# Patient Record
Sex: FEMALE | Race: BLACK OR AFRICAN AMERICAN
[De-identification: names, ages, dates, MRNs, and addresses within clinical notes are randomized per-mention and may not be internally consistent; named-entity substitution may affect disease eponyms.]

---
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
## 2021-06-18 ENCOUNTER — HOSPITAL ENCOUNTER (EMERGENCY)
Dept: HOSPITAL 47 - EC | Age: 21
Discharge: HOME | End: 2021-06-18
Payer: COMMERCIAL

## 2021-06-18 VITALS — HEART RATE: 93 BPM | RESPIRATION RATE: 18 BRPM | DIASTOLIC BLOOD PRESSURE: 54 MMHG | SYSTOLIC BLOOD PRESSURE: 104 MMHG

## 2021-06-18 VITALS — TEMPERATURE: 98.4 F

## 2021-06-18 DIAGNOSIS — O99.281: ICD-10-CM

## 2021-06-18 DIAGNOSIS — E86.0: ICD-10-CM

## 2021-06-18 DIAGNOSIS — O26.891: Primary | ICD-10-CM

## 2021-06-18 DIAGNOSIS — Z3A.13: ICD-10-CM

## 2021-06-18 DIAGNOSIS — R55: ICD-10-CM

## 2021-06-18 LAB
ALBUMIN SERPL-MCNC: 3.7 G/DL (ref 3.5–5)
ALP SERPL-CCNC: 66 U/L (ref 38–126)
ALT SERPL-CCNC: 22 U/L (ref 4–34)
ANION GAP SERPL CALC-SCNC: 9 MMOL/L
APTT BLD: 21.9 SEC (ref 22–30)
AST SERPL-CCNC: 28 U/L (ref 14–36)
BASOPHILS # BLD AUTO: 0 K/UL (ref 0–0.2)
BASOPHILS NFR BLD AUTO: 0 %
BUN SERPL-SCNC: 8 MG/DL (ref 7–17)
CALCIUM SPEC-MCNC: 9.4 MG/DL (ref 8.4–10.2)
CHLORIDE SERPL-SCNC: 107 MMOL/L (ref 98–107)
CO2 SERPL-SCNC: 20 MMOL/L (ref 22–30)
EOSINOPHIL # BLD AUTO: 0.1 K/UL (ref 0–0.7)
EOSINOPHIL NFR BLD AUTO: 2 %
ERYTHROCYTE [DISTWIDTH] IN BLOOD BY AUTOMATED COUNT: 4.16 M/UL (ref 3.8–5.4)
ERYTHROCYTE [DISTWIDTH] IN BLOOD: 16 % (ref 11.5–15.5)
GLUCOSE BLD-MCNC: 122 MG/DL (ref 75–99)
GLUCOSE SERPL-MCNC: 104 MG/DL (ref 74–99)
HCG SERPL-MCNC: (no result) MIU/ML
HCT VFR BLD AUTO: 32.6 % (ref 34–46)
HGB BLD-MCNC: 10.7 GM/DL (ref 11.4–16)
INR PPP: 0.9 (ref ?–1.2)
KETONES UR QL STRIP.AUTO: (no result)
LYMPHOCYTES # SPEC AUTO: 1 K/UL (ref 1–4.8)
LYMPHOCYTES NFR SPEC AUTO: 16 %
MAGNESIUM SPEC-SCNC: 1.7 MG/DL (ref 1.6–2.3)
MCH RBC QN AUTO: 25.7 PG (ref 25–35)
MCHC RBC AUTO-ENTMCNC: 32.9 G/DL (ref 31–37)
MCV RBC AUTO: 78.3 FL (ref 80–100)
MONOCYTES # BLD AUTO: 0.3 K/UL (ref 0–1)
MONOCYTES NFR BLD AUTO: 5 %
NEUTROPHILS # BLD AUTO: 4.4 K/UL (ref 1.3–7.7)
NEUTROPHILS NFR BLD AUTO: 76 %
PH UR: 6 [PH] (ref 5–8)
PLATELET # BLD AUTO: 288 K/UL (ref 150–450)
POTASSIUM SERPL-SCNC: 3.9 MMOL/L (ref 3.5–5.1)
PROT SERPL-MCNC: 6.5 G/DL (ref 6.3–8.2)
PROT UR QL: (no result)
PT BLD: 9.5 SEC (ref 9–12)
RBC UR QL: <1 /HPF (ref 0–5)
SODIUM SERPL-SCNC: 136 MMOL/L (ref 137–145)
SP GR UR: 1.03 (ref 1–1.03)
SQUAMOUS UR QL AUTO: 9 /HPF (ref 0–4)
UROBILINOGEN UR QL STRIP: 2 MG/DL (ref ?–2)
WBC # BLD AUTO: 5.8 K/UL (ref 4–11)
WBC # UR AUTO: 4 /HPF (ref 0–5)

## 2021-06-18 PROCEDURE — 99285 EMERGENCY DEPT VISIT HI MDM: CPT

## 2021-06-18 PROCEDURE — 83735 ASSAY OF MAGNESIUM: CPT

## 2021-06-18 PROCEDURE — 85610 PROTHROMBIN TIME: CPT

## 2021-06-18 PROCEDURE — 76805 OB US >/= 14 WKS SNGL FETUS: CPT

## 2021-06-18 PROCEDURE — 85025 COMPLETE CBC W/AUTO DIFF WBC: CPT

## 2021-06-18 PROCEDURE — 84702 CHORIONIC GONADOTROPIN TEST: CPT

## 2021-06-18 PROCEDURE — 80053 COMPREHEN METABOLIC PANEL: CPT

## 2021-06-18 PROCEDURE — 70450 CT HEAD/BRAIN W/O DYE: CPT

## 2021-06-18 PROCEDURE — 96361 HYDRATE IV INFUSION ADD-ON: CPT

## 2021-06-18 PROCEDURE — 93005 ELECTROCARDIOGRAM TRACING: CPT

## 2021-06-18 PROCEDURE — 81001 URINALYSIS AUTO W/SCOPE: CPT

## 2021-06-18 PROCEDURE — 84484 ASSAY OF TROPONIN QUANT: CPT

## 2021-06-18 PROCEDURE — 96360 HYDRATION IV INFUSION INIT: CPT

## 2021-06-18 PROCEDURE — 85730 THROMBOPLASTIN TIME PARTIAL: CPT

## 2021-06-18 PROCEDURE — 36415 COLL VENOUS BLD VENIPUNCTURE: CPT

## 2021-06-18 PROCEDURE — 71046 X-RAY EXAM CHEST 2 VIEWS: CPT

## 2021-06-18 NOTE — CT
EXAMINATION TYPE: CT brain wo con

 

DATE OF EXAM: 6/18/2021

 

COMPARISON: 2/13/2011

 

HISTORY: Synocpe, pt LOC, hit back of head. PT 13 weeks pregnant, made aware of the radiation risk an
d patient agreed to test.

 

CT DLP: 1044.4 mGycm

Automated exposure control for dose reduction was used.

 

Ventricles and sulci appear normal. There is no mass effect nor midline shift. There is no sign of in
tracranial hemorrhage. The calvarium is intact. There is normal aeration of the mastoid sinuses.

 

IMPRESSION:

Negative CT scan of the brain. No change.

## 2021-06-18 NOTE — ED
General Adult HPI





- General


Chief complaint: Syncope


Stated complaint: Passout


Time Seen by Provider: 06/18/21 18:29


Source: patient, EMS, RN notes reviewed


Mode of arrival: EMS


Limitations: no limitations





- History of Present Illness


Initial comments: 





Patient is a pleasant 20-year-old female presenting to the emergency department 

following syncopal episode.  Patient does have history of similar symptoms 

previously.  Patient believes she is around 13 weeks pregnant.  No vaginal pain 

or pelvic pain.  No vaginal discharge or bleeding.  Patient has had some dyspnea

persistent since beginning of pregnancy, no recent change.  Patient felt 

lightheaded and fell down today.  Patient believes she passed out prior to 

striking the ground.  Patient does have headache that is improving.  No 

laceration.  No visual change.  No chest pain.





- Related Data


                                Home Medications











 Medication  Instructions  Recorded  Confirmed


 


Multivitamins, Thera [Multivitamin 1 tab PO DAILY 03/26/18 03/26/18





(formulary)]   








                                  Previous Rx's











 Medication  Instructions  Recorded


 


Cefixime [Suprax] 400 mg PO DAILY #7 cap 03/26/18











                                    Allergies











Allergy/AdvReac Type Severity Reaction Status Date / Time


 


No Known Allergies Allergy   Verified 06/18/21 18:29














Review of Systems


ROS Statement: 


Those systems with pertinent positive or pertinent negative responses have been 

documented in the HPI.





ROS Other: All systems not noted in ROS Statement are negative.


Constitutional: Denies: fever


Eyes: Denies: eye pain


ENT: Denies: ear pain


Respiratory: Denies: cough


Cardiovascular: Denies: chest pain


Endocrine: Denies: fatigue


Gastrointestinal: Denies: abdominal pain


Genitourinary: Reports: as per HPI.  Denies: dysuria


Musculoskeletal: Denies: back pain


Skin: Denies: rash


Neurological: Reports: as per HPI, headache.  Denies: weakness, confusion





Past Medical History


Past Medical History: No Reported History


History of Any Multi-Drug Resistant Organisms: None Reported


Past Surgical History: Tonsillectomy


Past Psychological History: Anxiety, Depression


Past Alcohol Use History: None Reported


Past Drug Use History: None Reported





General Exam


Limitations: no limitations


General appearance: alert, in no apparent distress


Head exam: Present: atraumatic, normocephalic, other (Mild tenderness posterior 

scalp)


Eye exam: Present: normal appearance, PERRL, EOMI.  Absent: nystagmus


ENT exam: Present: normal oropharynx


Neck exam: Present: normal inspection.  Absent: tenderness


Respiratory exam: Present: normal lung sounds bilaterally


Cardiovascular Exam: Present: regular rate, normal rhythm, normal heart sounds


  ** Expanded


Peripheral pulses: 2+: Radial (R), Radial (L), Posterior Tibialis (R), Posterior

 Tibialis (L)


GI/Abdominal exam: Present: soft.  Absent: distended, tenderness, guarding


Extremities exam: Present: normal inspection.  Absent: pedal edema, calf 

tenderness


Neurological exam: Present: alert, oriented X3, CN II-XII intact.  Absent: motor

 sensory deficit


  ** Expanded


Motor strength exam: RUE: 5, LUE: 5, RLE: 5, LLE: 5


Eye Response: (4) open spontaneously


Motor Response: (6) obeys commands


Verbal Response: (5) oriented


Psychiatric exam: Present: normal affect, normal mood


Skin exam: Present: normal color





Course


                                   Vital Signs











  06/18/21 06/18/21





  18:29 19:44


 


Temperature 98.4 F 


 


Pulse Rate 104 H 95


 


Respiratory 16 16





Rate  


 


Blood Pressure 110/70 104/70


 


O2 Sat by Pulse 99 98





Oximetry  














EKG Findings





- EKG Comments:


EKG Findings:: Normal sinus rhythm with a rate of 100.  .  QRS 84.  QT 

338.  .  Right axis.  Normal QRS.  No acute ST change.





Medical Decision Making





- Medical Decision Making





Patient reevaluated and resting sitting upright in bed, symptom-free.  Patient 

updated on results.  Patient states she feels better and is comfortable going 

home.  Patient advised close follow-up with primary care physician and OB/GYN.





- Lab Data


Result diagrams: 


                                 06/18/21 19:04





                                 06/18/21 19:04


                                   Lab Results











  06/18/21 06/18/21 06/18/21 Range/Units





  18:36 19:01 19:04 


 


WBC    5.8  (4.0-11.0)  k/uL


 


RBC    4.16  (3.80-5.40)  m/uL


 


Hgb    10.7 L  (11.4-16.0)  gm/dL


 


Hct    32.6 L  (34.0-46.0)  %


 


MCV    78.3 L  (80.0-100.0)  fL


 


MCH    25.7  (25.0-35.0)  pg


 


MCHC    32.9  (31.0-37.0)  g/dL


 


RDW    16.0 H  (11.5-15.5)  %


 


Plt Count    288  (150-450)  k/uL


 


MPV    7.6  


 


Neutrophils %    76  %


 


Lymphocytes %    16  %


 


Monocytes %    5  %


 


Eosinophils %    2  %


 


Basophils %    0  %


 


Neutrophils #    4.4  (1.3-7.7)  k/uL


 


Lymphocytes #    1.0  (1.0-4.8)  k/uL


 


Monocytes #    0.3  (0-1.0)  k/uL


 


Eosinophils #    0.1  (0-0.7)  k/uL


 


Basophils #    0.0  (0-0.2)  k/uL


 


Hypochromasia    Slight  


 


Microcytosis    Slight  


 


PT     (9.0-12.0)  sec


 


INR     (<1.2)  


 


APTT     (22.0-30.0)  sec


 


Sodium     (137-145)  mmol/L


 


Potassium     (3.5-5.1)  mmol/L


 


Chloride     ()  mmol/L


 


Carbon Dioxide     (22-30)  mmol/L


 


Anion Gap     mmol/L


 


BUN     (7-17)  mg/dL


 


Creatinine     (0.52-1.04)  mg/dL


 


Est GFR (CKD-EPI)AfAm     (>60 ml/min/1.73 sqM)  


 


Est GFR (CKD-EPI)NonAf     (>60 ml/min/1.73 sqM)  


 


Glucose     (74-99)  mg/dL


 


POC Glucose (mg/dL)   122 H   (75-99)  mg/dL


 


POC Glu Operater Niraj Feliz   


 


Calcium     (8.4-10.2)  mg/dL


 


Magnesium     (1.6-2.3)  mg/dL


 


Total Bilirubin     (0.2-1.3)  mg/dL


 


AST     (14-36)  U/L


 


ALT     (4-34)  U/L


 


Alkaline Phosphatase     ()  U/L


 


Troponin I     (0.000-0.034)  ng/mL


 


Total Protein     (6.3-8.2)  g/dL


 


Albumin     (3.5-5.0)  g/dL


 


HCG, Quant     mIU/mL


 


Urine Color  Yellow    


 


Urine Appearance  Cloudy H    (Clear)  


 


Urine pH  6.0    (5.0-8.0)  


 


Ur Specific Gravity  1.031    (1.001-1.035)  


 


Urine Protein  1+ H    (Negative)  


 


Urine Glucose (UA)  Negative    (Negative)  


 


Urine Ketones  3+ H    (Negative)  


 


Urine Blood  Negative    (Negative)  


 


Urine Nitrite  Positive H    (Negative)  


 


Urine Bilirubin  Negative    (Negative)  


 


Urine Urobilinogen  2.0    (<2.0)  mg/dL


 


Ur Leukocyte Esterase  Trace H    (Negative)  


 


Urine RBC  <1    (0-5)  /hpf


 


Urine WBC  4    (0-5)  /hpf


 


Ur Squamous Epith Cells  9 H    (0-4)  /hpf


 


Urine Bacteria  Occasional H    (None)  /hpf


 


Urine Mucus  Moderate H    (None)  /hpf














  06/18/21 06/18/21 06/18/21 Range/Units





  19:04 19:04 19:04 


 


WBC     (4.0-11.0)  k/uL


 


RBC     (3.80-5.40)  m/uL


 


Hgb     (11.4-16.0)  gm/dL


 


Hct     (34.0-46.0)  %


 


MCV     (80.0-100.0)  fL


 


MCH     (25.0-35.0)  pg


 


MCHC     (31.0-37.0)  g/dL


 


RDW     (11.5-15.5)  %


 


Plt Count     (150-450)  k/uL


 


MPV     


 


Neutrophils %     %


 


Lymphocytes %     %


 


Monocytes %     %


 


Eosinophils %     %


 


Basophils %     %


 


Neutrophils #     (1.3-7.7)  k/uL


 


Lymphocytes #     (1.0-4.8)  k/uL


 


Monocytes #     (0-1.0)  k/uL


 


Eosinophils #     (0-0.7)  k/uL


 


Basophils #     (0-0.2)  k/uL


 


Hypochromasia     


 


Microcytosis     


 


PT  9.5    (9.0-12.0)  sec


 


INR  0.9    (<1.2)  


 


APTT  21.9 L    (22.0-30.0)  sec


 


Sodium   136 L   (137-145)  mmol/L


 


Potassium   3.9   (3.5-5.1)  mmol/L


 


Chloride   107   ()  mmol/L


 


Carbon Dioxide   20 L   (22-30)  mmol/L


 


Anion Gap   9   mmol/L


 


BUN   8   (7-17)  mg/dL


 


Creatinine   0.59   (0.52-1.04)  mg/dL


 


Est GFR (CKD-EPI)AfAm   >90   (>60 ml/min/1.73 sqM)  


 


Est GFR (CKD-EPI)NonAf   >90   (>60 ml/min/1.73 sqM)  


 


Glucose   104 H   (74-99)  mg/dL


 


POC Glucose (mg/dL)     (75-99)  mg/dL


 


POC Glu Operater ID     


 


Calcium   9.4   (8.4-10.2)  mg/dL


 


Magnesium   1.7   (1.6-2.3)  mg/dL


 


Total Bilirubin   <0.1 L   (0.2-1.3)  mg/dL


 


AST   28   (14-36)  U/L


 


ALT   22   (4-34)  U/L


 


Alkaline Phosphatase   66   ()  U/L


 


Troponin I    <0.012  (0.000-0.034)  ng/mL


 


Total Protein   6.5   (6.3-8.2)  g/dL


 


Albumin   3.7   (3.5-5.0)  g/dL


 


HCG, Quant   26250.1   mIU/mL


 


Urine Color     


 


Urine Appearance     (Clear)  


 


Urine pH     (5.0-8.0)  


 


Ur Specific Gravity     (1.001-1.035)  


 


Urine Protein     (Negative)  


 


Urine Glucose (UA)     (Negative)  


 


Urine Ketones     (Negative)  


 


Urine Blood     (Negative)  


 


Urine Nitrite     (Negative)  


 


Urine Bilirubin     (Negative)  


 


Urine Urobilinogen     (<2.0)  mg/dL


 


Ur Leukocyte Esterase     (Negative)  


 


Urine RBC     (0-5)  /hpf


 


Urine WBC     (0-5)  /hpf


 


Ur Squamous Epith Cells     (0-4)  /hpf


 


Urine Bacteria     (None)  /hpf


 


Urine Mucus     (None)  /hpf














- Radiology Data


Radiology results: report reviewed (Negative computed tomography scan of the 

brain.  Ultrasound shows 14 week 2 day IUP), image reviewed (Two-view chest x-

ray shows no acute process.)





Disposition


Clinical Impression: 


 Syncope, Pregnancy, Dehydration





Disposition: HOME SELF-CARE


Condition: Stable


Instructions (If sedation given, give patient instructions):  Pregnancy (ED), 

Syncope (ED)


Additional Instructions: 


Please do follow-up with primary care physician and OB/GYN in the next couple 

days for recheck.  Return for passing out, chest pain or shortness of breath, 

pelvic pain or bleeding, worsening symptoms or other concerns.


Is patient prescribed a controlled substance at d/c from ED?: No


Referrals: 


Waleska Villasenor MD [Primary Care Provider] - 1-2 days


Nash Baires MD [STAFF PHYSICIAN] - 1-2 days


Time of Disposition: 20:46

## 2021-06-18 NOTE — XR
EXAMINATION TYPE: XR chest 2V

 

DATE OF EXAM: 6/18/2021

 

COMPARISON: NONE

 

HISTORY: 4/22/2005

 

TECHNIQUE: 2 views

 

FINDINGS: Heart and mediastinum are normal. Lungs are clear. Diaphragm is normal. Bony thorax appears
 normal.

 

IMPRESSION: Normal chest.

## 2021-06-18 NOTE — US
EXAMINATION TYPE: US OB >= 14 wk fetus

 

DATE OF EXAM: 6/18/2021

 

COMPARISON: None

 

CLINICAL HISTORY: pregnant, syncope

 

TECHNIQUE:  Transabdominal (TA)

 

GESTATIONAL AGE / DATING

Dates by LMP: (13 weeks/1 days) 

** EDC: 12/23/2021

Dates by Current Scan: (14 weeks/2 days) 

** EDC: 12/15/2021 

 

FETAL SURVEY

IUP:  Single

PLACENTA: Anterior     

PREVIA:  Low Lying

** GEGE: Too early to measure 

CERVICAL LENGTH (transabdominal: norm > 3.0cm): 3.1 cm

 

FETAL BIOMETRY

PRESENTATION:  Breech

FETAL LIE:  Transverse with head maternal right

BPD: 2.5 cm

**  14 weeks / 3 days

HC: 9.7 cm

**  14 weeks / 4 days

AC: 7.8 cm

**  14 weeks / 2 days

FL: 1.2 cm

**  13 weeks / 5 days

ESTIMATED FETAL WEIGHT IN GRAMS:  88 grams

ESTIMATED FETAL WEIGHT IN LBS/OZ:  0 lbs. 3 oz. 

WEIGHT PERCENTAGE BASED ON ESTABLISHED DATES:   89%

HC/AC: 1.24 Normal

FL/AC: 16% Normal 

HEART RATE:  152 bpm 

RHYTHM:  Normal

 

Viable IUP with an ERIC of 12/15/2021 by this exam. Low lying placenta 

 

 

 

IMPRESSION:

The ultrasound gestational age is 14 weeks and 2 days.

## 2021-11-30 ENCOUNTER — HOSPITAL ENCOUNTER (OUTPATIENT)
Dept: HOSPITAL 47 - FBPOP | Age: 21
Setting detail: OBSERVATION
Discharge: HOME | End: 2021-11-30
Attending: OBSTETRICS & GYNECOLOGY | Admitting: OBSTETRICS & GYNECOLOGY
Payer: MEDICARE

## 2021-11-30 VITALS
HEART RATE: 102 BPM | DIASTOLIC BLOOD PRESSURE: 75 MMHG | SYSTOLIC BLOOD PRESSURE: 149 MMHG | TEMPERATURE: 97.2 F | RESPIRATION RATE: 16 BRPM

## 2021-11-30 DIAGNOSIS — O23.43: Primary | ICD-10-CM

## 2021-11-30 DIAGNOSIS — Z87.440: ICD-10-CM

## 2021-11-30 DIAGNOSIS — O99.013: ICD-10-CM

## 2021-11-30 DIAGNOSIS — F32.A: ICD-10-CM

## 2021-11-30 DIAGNOSIS — O99.343: ICD-10-CM

## 2021-11-30 DIAGNOSIS — Z98.890: ICD-10-CM

## 2021-11-30 DIAGNOSIS — Z3A.36: ICD-10-CM

## 2021-11-30 DIAGNOSIS — Z87.442: ICD-10-CM

## 2021-11-30 DIAGNOSIS — F41.9: ICD-10-CM

## 2021-11-30 LAB
BASOPHILS # BLD AUTO: 0 K/UL (ref 0–0.2)
BASOPHILS NFR BLD AUTO: 0 %
EOSINOPHIL # BLD AUTO: 0.1 K/UL (ref 0–0.7)
EOSINOPHIL NFR BLD AUTO: 1 %
ERYTHROCYTE [DISTWIDTH] IN BLOOD BY AUTOMATED COUNT: 3.91 M/UL (ref 3.8–5.4)
ERYTHROCYTE [DISTWIDTH] IN BLOOD: 23.3 % (ref 11.5–15.5)
HCT VFR BLD AUTO: 32.1 % (ref 34–46)
HGB BLD-MCNC: 9.9 GM/DL (ref 11.4–16)
KETONES UR QL STRIP.AUTO: (no result)
LYMPHOCYTES # SPEC AUTO: 1.4 K/UL (ref 1–4.8)
LYMPHOCYTES NFR SPEC AUTO: 18 %
MCH RBC QN AUTO: 25.3 PG (ref 25–35)
MCHC RBC AUTO-ENTMCNC: 30.9 G/DL (ref 31–37)
MCV RBC AUTO: 82.1 FL (ref 80–100)
MONOCYTES # BLD AUTO: 0.5 K/UL (ref 0–1)
MONOCYTES NFR BLD AUTO: 6 %
NEUTROPHILS # BLD AUTO: 5.7 K/UL (ref 1.3–7.7)
NEUTROPHILS NFR BLD AUTO: 74 %
PH UR: 6.5 [PH] (ref 5–8)
PLATELET # BLD AUTO: 275 K/UL (ref 150–450)
PROT UR QL: (no result)
RBC UR QL: >182 /HPF (ref 0–5)
SP GR UR: 1.02 (ref 1–1.03)
SQUAMOUS UR QL AUTO: 10 /HPF (ref 0–4)
UROBILINOGEN UR QL STRIP: <2 MG/DL (ref ?–2)
WBC # BLD AUTO: 7.7 K/UL (ref 4–11)
WBC #/AREA URNS HPF: >182 /HPF (ref 0–5)

## 2021-11-30 PROCEDURE — 59025 FETAL NON-STRESS TEST: CPT

## 2021-11-30 PROCEDURE — 87086 URINE CULTURE/COLONY COUNT: CPT

## 2021-11-30 PROCEDURE — 85025 COMPLETE CBC W/AUTO DIFF WBC: CPT

## 2021-11-30 PROCEDURE — 81001 URINALYSIS AUTO W/SCOPE: CPT

## 2021-11-30 PROCEDURE — 99213 OFFICE O/P EST LOW 20 MIN: CPT

## 2021-11-30 NOTE — P.HPOB
History of Present Illness


H&P Date: 21


Chief Complaint: right flank pain





This is a 21-year-old female  1 para 0 with an estimated date of 

confinement of 2021, estimated gestational age of 36-5/7 weeks who 

presents with complaints of right flank pain which began all of a sudden a few 

hours prior to arrival to the hospital.  She denies any nausea or vomiting.  

Pain is better at this time.  She has not noticed any blood when she is 

urinating.  She did pass a mucous plug a couple days ago.  Prenatal course has 

been otherwise uncomplicated.  She does have a history of urinary tract 

infections during this pregnancy.





Review of Systems


Constitutional: Denies chills, Denies fever


Eyes: denies blurred vision, denies pain


Ears, nose, mouth and throat: Denies headache, Denies sore throat


Cardiovascular: Denies chest pain, Denies shortness of breath


Respiratory: Denies cough


Gastrointestinal: Reports abdominal pain (irregular contractions), Denies 

nausea, Denies vomiting


Genitourinary: Reports flank pain (right), Reports pelvic pain, Reports 

pregnant, Denies abnormal vaginal bleeding


Musculoskeletal: Reports low back pain





Past Medical History


Past Medical History: No Reported History


Additional Past Medical History / Comment(s): low iron during this pregnancy- 

history of iron infusions


History of Any Multi-Drug Resistant Organisms: None Reported


Past Surgical History: No Surgical Hx Reported, Tonsillectomy


Past Anesthesia/Blood Transfusion Reactions: No Reported Reaction


Past Psychological History: Anxiety, Depression


Smoking Status: Never smoker


Past Alcohol Use History: None Reported


Past Drug Use History: None Reported





Medications and Allergies


                                Home Medications











 Medication  Instructions  Recorded  Confirmed  Type


 


Multivitamins, Thera [Multivitamin 1 tab PO DAILY 18 History





(formulary)]    








                                    Allergies











Allergy/AdvReac Type Severity Reaction Status Date / Time


 


No Known Allergies Allergy   Verified 21 18:29














Exam


Osteopathic Statement: *.  No significant issues noted on an osteopathic 

structural exam other than those noted in the History and Physical/Consult.


                                   Vital Signs











  Temp Pulse Resp BP Pulse Ox


 


 21 05:30  97.2 F L  102 H  16  149/75 


 


 21 03:19  97.5 F L  107 H  16  136/72  97








                                Intake and Output











 21





 22:59 06:59 14:59


 


Other:   


 


  # Voids  1 


 


  Weight  102.058 kg 














Gen.: Well-developed nourished female in no acute distress


Heart: Regular rate and rhythm


Lungs: Clear to auscultation bilaterally


Abdomen: Soft, , nontender


Back: No CVA tenderness on the right.  Minimal tenderness to touch.


extremities: Negative Homans


Fetal heart tones: Reactive, category 1


Contractions: Irregular





Results


Result Diagrams: 


                                 21 05:36





                  Abnormal Lab Results - Last 24 Hours (Table)











  21 Range/Units





  03:22 05:36 


 


Hgb   9.9 L  (11.4-16.0)  gm/dL


 


Hct   32.1 L  (34.0-46.0)  %


 


MCHC   30.9 L  (31.0-37.0)  g/dL


 


RDW   23.3 H  (11.5-15.5)  %


 


Urine Appearance  Turbid H   (Clear)  


 


Urine Protein  2+ H   (Negative)  


 


Urine Ketones  2+ H   (Negative)  


 


Urine Blood  Large H   (Negative)  


 


Urine Nitrite  Positive H   (Negative)  


 


Ur Leukocyte Esterase  Large H   (Negative)  


 


Urine RBC  >182 H   (0-5)  /hpf


 


Urine WBC  >182 H   (0-5)  /hpf


 


Urine WBC Clumps  Few H   (None)  /hpf


 


Ur Squamous Epith Cells  10 H   (0-4)  /hpf


 


Urine Bacteria  Occasional H   (None)  /hpf


 


Urine Mucus  Moderate H   (None)  /hpf














Assessment and Plan


(1) Urinary tract infection affecting pregnancy, antepartum


Current Visit: Yes   Status: Acute   Code(s): O23.40 - UNSP INFECTION OF URINARY

TRACT IN PREGNANCY, UNSP TRIMESTER   SNOMED Code(s): 008418566


   


Plan: 





Since the patient is doing well this morning after receiving one dose of IV 

antibiotic and IV hydration, will discharge home on oral antibiotics with 

Keflex.  She is advised to follow-up in the office in approximately 1 week for 

prenatal visit.  She is advised to return to the hospital she has any high 

fevers, severe flank pain, nausea and vomiting, or labor signs or symptoms.

## 2021-11-30 NOTE — P.DS
Providers


Date of admission: 


21 05:23





Expected date of discharge: 21


Attending physician: 


Amy Humphrey





Primary care physician: 


Stated None








- Discharge Diagnosis(es)


(1) Urinary tract infection affecting pregnancy, antepartum


Current Visit: Yes   Status: Acute   


Hospital Course: 





this is a 21-year-old female  1 para 0 at 36-5/7 weeks who presented with

right flank pain of sudden onset.  Urinalysis is consistent with urinary tract 

infection.  Her white count is normal and she is afebrile.  She is feeling 

better after 1 dose of IV antibiotics and IV hydration.  Will discharge home 

today on oral Keflex 500 mg 4 times a day for 7 days.  She is encouraged to 

continue oral hydration and ambulation.  She is advised to return to the 

hospital if she has any further severe flank pain, nausea and vomiting, high 

fevers, or any worsening symptoms.


Patient Condition at Discharge: Stable





Plan - Discharge Summary


New Discharge Prescriptions: 


New


   Cephalexin [Keflex] 500 mg PO Q6HR 7 Days #28 cap





No Action


   Multivitamins, Thera [Multivitamin (formulary)] 1 tab PO DAILY


Discharge Medication List





Multivitamins, Thera [Multivitamin (formulary)] 1 tab PO DAILY 18 

[History]


Cephalexin [Keflex] 500 mg PO Q6HR 7 Days #28 cap 21 [Rx]








Follow up Appointment(s)/Referral(s): 


Amy Humphrey DO [Doctor of Osteopathic Medicine] - 1 Week


Discharge Disposition: HOME SELF-CARE

## 2021-11-30 NOTE — P.PN
Progress Note - Text


Progress Note Date: 21





Abdominal pain history of stones.  Patient is a 21-year-old  1 para 0 at 

36 weeks 4 days gestation who arrives complaining of abdominal/flank pain that 

began earlier the day.  A urinalysis was done showing nitrites as well as 

protein and greater than 180 red blood cells and white blood cells.  She does 

have a history of kidney stones and this may certainly reflect that but she also

has a bladder infection.  There is only occasional bacteria and there are no 

white blood cells and clumps decreasing the likelihood of pyelonephritis 

symptoms don't really appear pyelonephritis like at this time either.  Her vital

signs are stable and she is afebrile.  She has no other issues with the p

regnancy and is otherwise feeling well.  On physical exam heart regular lungs 

are clear, abdomen is soft and there is no costovertebral angle tenderness.  I 

suspect this is just a urinary tract infection may be compounded by kidney 

stones.  We did discuss the option of obtaining an ultrasound to have them 

assess her kidney stones but since it will not really dramatically effect 

treatment she is declined the ultrasound.  We will start IV hydration and IV 

antibiotics with expectation that we may be able to discharge her later today on

oral antibiotics with continued improvement.

## 2021-12-21 NOTE — P.HPOB
History of Present Illness


H&P Date: 21


Chief Complaint: Induction of labor





This is a 21 y.o. female,  1, para 0, with an estimated date of 

confinement of 2021, estimated gestational age of 39-6/7 weeks, who 

presents for induction of labor.  She complains of irregular contractions and 

pressure.  Her pregnancy has been complicated by anemia and she did receive 3 

iron infusions through Dr. Ruvalcaba's office.  Her 35 week ultrasound showed an 

estimated fetal weight of 6#7oz (75-90%), with abdominal circumference at 91%.





Prenatal labs:





Hepatitis B surface antigen-neg


RPR-NR


Rubella-immune


Blood type-O+


Antibody screen-neg


HIV-NR


Hemoglobin-9.3


Quad screen-neg


GC/Chlamyida/Trich-neg


1 hr. GTT-116/Hgb-8.3


GBS-neg


 


OB Hx:  


Gyn Hx:  No hx STDs


Social Hx: Single, unemployed.





Review of Systems


Constitutional: Denies chills, Denies fever


Eyes: denies blurred vision, denies pain


Ears, nose, mouth and throat: Denies headache, Denies sore throat


Cardiovascular: Denies chest pain, Denies shortness of breath


Respiratory: Denies cough


Gastrointestinal: Reports abdominal pain (Irregular contractions)


Genitourinary: Reports pelvic pain, Reports pregnant


Musculoskeletal: Reports low back pain


Integumentary: Denies pruritus, Denies rash


Neurological: Denies numbness, Denies weakness


Psychiatric: Reports anxiety, Reports depression





Past Medical History


Past Medical History: No Reported History


Additional Past Medical History / Comment(s): low iron during this pregnancy- 

history of iron infusions


History of Any Multi-Drug Resistant Organisms: None Reported


Past Surgical History: No Surgical Hx Reported, Tonsillectomy


Past Anesthesia/Blood Transfusion Reactions: No Reported Reaction


Past Psychological History: Anxiety, Depression


Smoking Status: Never smoker


Past Alcohol Use History: None Reported


Past Drug Use History: None Reported





Medications and Allergies


                                Home Medications











 Medication  Instructions  Recorded  Confirmed  Type


 


Multivitamins, Thera [Multivitamin 1 tab PO DAILY 18 History





(formulary)]    








                                    Allergies











Allergy/AdvReac Type Severity Reaction Status Date / Time


 


No Known Allergies Allergy   Verified 21 18:29














Exam


Osteopathic Statement: *.  No significant issues noted on an osteopathic 

structural exam other than those noted in the History and Physical/Consult.





HEENT:  within normal limits


Heart:  regular rate and rhythm


Lungs:  clear to auscultation bilaterally


Abdomen:  , fundal height 41 cm


Cervix:  1.5 cm/70%/-2


Fetal heart tones:  120's by doppler's


Extremities:  neg. Kristin's





Assessment and Plan


(1) 39 weeks gestation of pregnancy


Status: Acute   Code(s): Z3A.39 - 39 WEEKS GESTATION OF PREGNANCY   SNOMED 

Code(s): 71059115


   





(2) Anemia


Status: Acute   Code(s): D64.9 - ANEMIA, UNSPECIFIED   SNOMED Code(s): 914371890


   


Plan: 


Proceed with oxytocin induction of labor.  Expectant management.  Epidural 

anesthesia if desired.

## 2021-12-22 ENCOUNTER — HOSPITAL ENCOUNTER (INPATIENT)
Dept: HOSPITAL 47 - 4FBP | Age: 21
LOS: 3 days | Discharge: HOME | End: 2021-12-25
Attending: OBSTETRICS & GYNECOLOGY | Admitting: OBSTETRICS & GYNECOLOGY
Payer: COMMERCIAL

## 2021-12-22 DIAGNOSIS — F41.9: ICD-10-CM

## 2021-12-22 DIAGNOSIS — F32.A: ICD-10-CM

## 2021-12-22 DIAGNOSIS — D64.9: ICD-10-CM

## 2021-12-22 DIAGNOSIS — Z3A.39: ICD-10-CM

## 2021-12-22 LAB
BASOPHILS # BLD AUTO: 0 K/UL (ref 0–0.2)
BASOPHILS NFR BLD AUTO: 0 %
EOSINOPHIL # BLD AUTO: 0.1 K/UL (ref 0–0.7)
EOSINOPHIL NFR BLD AUTO: 1 %
ERYTHROCYTE [DISTWIDTH] IN BLOOD BY AUTOMATED COUNT: 4.18 M/UL (ref 3.8–5.4)
ERYTHROCYTE [DISTWIDTH] IN BLOOD: 20.9 % (ref 11.5–15.5)
HCT VFR BLD AUTO: 37.2 % (ref 34–46)
HGB BLD-MCNC: 11.4 GM/DL (ref 11.4–16)
LYMPHOCYTES # SPEC AUTO: 1.7 K/UL (ref 1–4.8)
LYMPHOCYTES NFR SPEC AUTO: 30 %
MCH RBC QN AUTO: 27.2 PG (ref 25–35)
MCHC RBC AUTO-ENTMCNC: 30.5 G/DL (ref 31–37)
MCV RBC AUTO: 89 FL (ref 80–100)
MONOCYTES # BLD AUTO: 0.4 K/UL (ref 0–1)
MONOCYTES NFR BLD AUTO: 7 %
NEUTROPHILS # BLD AUTO: 3.3 K/UL (ref 1.3–7.7)
NEUTROPHILS NFR BLD AUTO: 59 %
PLATELET # BLD AUTO: 251 K/UL (ref 150–450)
WBC # BLD AUTO: 5.5 K/UL (ref 4–11)

## 2021-12-22 PROCEDURE — 86901 BLOOD TYPING SEROLOGIC RH(D): CPT

## 2021-12-22 PROCEDURE — 86900 BLOOD TYPING SEROLOGIC ABO: CPT

## 2021-12-22 PROCEDURE — 85025 COMPLETE CBC W/AUTO DIFF WBC: CPT

## 2021-12-22 PROCEDURE — 86850 RBC ANTIBODY SCREEN: CPT

## 2021-12-22 RX ADMIN — POTASSIUM CHLORIDE SCH MLS/HR: 14.9 INJECTION, SOLUTION INTRAVENOUS at 07:00

## 2021-12-22 RX ADMIN — POTASSIUM CHLORIDE SCH MLS/HR: 14.9 INJECTION, SOLUTION INTRAVENOUS at 18:14

## 2021-12-23 RX ADMIN — KETOROLAC TROMETHAMINE SCH MG: 30 INJECTION, SOLUTION INTRAMUSCULAR at 11:30

## 2021-12-23 RX ADMIN — THERA TABS SCH: TAB at 20:11

## 2021-12-23 RX ADMIN — DOCUSATE SODIUM AND SENNOSIDES SCH EACH: 50; 8.6 TABLET ORAL at 21:00

## 2021-12-23 RX ADMIN — POTASSIUM CHLORIDE SCH MLS/HR: 14.9 INJECTION, SOLUTION INTRAVENOUS at 04:38

## 2021-12-23 RX ADMIN — IBUPROFEN SCH: 600 TABLET ORAL at 18:11

## 2021-12-23 RX ADMIN — ACETAMINOPHEN SCH: 500 TABLET ORAL at 20:11

## 2021-12-23 RX ADMIN — IBUPROFEN SCH: 600 TABLET ORAL at 18:10

## 2021-12-23 RX ADMIN — IBUPROFEN SCH: 600 TABLET ORAL at 20:11

## 2021-12-23 RX ADMIN — DOCUSATE SODIUM AND SENNOSIDES SCH: 50; 8.6 TABLET ORAL at 20:11

## 2021-12-23 RX ADMIN — KETOROLAC TROMETHAMINE SCH MG: 30 INJECTION, SOLUTION INTRAMUSCULAR at 17:55

## 2021-12-23 RX ADMIN — ACETAMINOPHEN SCH MG: 500 TABLET ORAL at 21:01

## 2021-12-23 RX ADMIN — ACETAMINOPHEN SCH MG: 500 TABLET ORAL at 04:31

## 2021-12-23 NOTE — P.OP
Date of Procedure: 21


Preoperative Diagnosis: 


1.  Intrauterine pregnancy at 40-0/7 weeks.


2.  Failure to progress.


3.  Maternal exhaustion.





Postoperative Diagnosis: 


Same


Procedure(s) Performed: 


Primary low transverse  section


Anesthesia: epidural (Plus Duramorph)


Surgeon: Amy Humphrey


Assistant #1: Belinda Irene


Estimated Blood Loss (ml): 520


Pathology: none sent


Condition: stable


Disposition: floor


Indications for Procedure: 


This is a 21-year-old female  1 para 0 at 40-0/7 weeks who underwent 

oxytocin induction of labor early yesterday morning and progressed to a maximum 

of 7 cm and made no further change in at least 2 hours despite adequate 

contractions.  Her cervix became slightly swollen and It was noted.  She also 

was uncomfortable and was exhausted and was requesting  delivery.


Operative Findings: 


A viable female infant is noted in the vertex presentation with Apgar scores of 

8 at 1 minute and 9 at 5 minutes and infant weight of 7 lbs. 4 oz.  At was noted

on the infant.  Normal uterus tubes and ovaries are noted.


Description of Procedure: 


The patient is taken to the operating room where she is placed in the dorsal 

supine position with leftward tilt after epidural anesthesia is bolused.  She is

prepped and draped in the normal sterile fashion.  Skin was tested and found to 

be adequately anesthetized.  A Pfannenstiel skin incision was made with a 

scalpel.  A second knife was used to carry the incision down to the underlying 

layer of fascia.  The fascia was nicked in the midline with a scalpel and then 

extended laterally bilaterally with Miller scissors.  The anterior lip of the 

fascia was grasped with 2 Kocher clamps and then dissected off the underlying 

rectus muscle in the midline with Miller scissors.  The inferior aspect of the 

fascial incision was grasped with 2 Kocher clamps and dissected off the 

underlying rectus muscle and the midline with Miller scissors.  Next the 

peritoneum layer was tented up with 2 hemostats and then entered sharply with 

the scalpel.  The incision is extended superiorly and inferiorly with Metzenbaum

scissors.  Next a DeLee retractor is placed.  The vesicouterine peritoneum is 

entered sharply with Metzenbaum scissors and extended laterally bilaterally with

Metzenbaum scissors and then the bladder flap is pushed inferiorly.  The lower 

uterine segment is incised in transverse fashion with the scalpel and then 

bluntly entered with a hemostat.  Clear fluid is noted.  The incision was then 

extended laterally bilaterally with 2 fingers.  Next the infant's head is 

delivered through the incision.  Nose and mouth are bulb suctioned.  The 

remainder of the infant is easily delivered and placed on mother's abdomen.  

Cord is clamped and cut.  Infant is taken to warmer by nursing staff.  Uterine 

fundus is gently massaged and placenta is delivered manually.  Uterus is 

exteriorized and cleared of all clots and debris.  Uterine incision is closed 

with 0 Vicryl suture in a running locked fashion.  A second layer of 0 Vicryl 

suture is used in a running fashion for hemostasis.  Once adequate hemostasis as

assured, the vesicouterine peritoneum is reapproximated with 2-0 Vicryl suture 

in a running fashion.  Posterior cul-de-sac is suctioned of all clots and 

debris.  Uterus is returned to the abdomen.  Incision is noted to be hemostatic.

 Peritoneal layer is closed with 0 Vicryl suture in a running fashion.  Muscle 

layer is reapproximated with 0 Vicryl suture in interrupted fashion.  Fascia 

layer is then closed with 0 PDS suture with 2 sutures meeting in the midline and

the knots buried in either side and in the midline.  The subcutaneous tissue was

then closed with 2-0 Vicryl suture.  Skin layer was then closed with staples.  

All sponge and needle counts are correct.  The patient is taken to recovery room

in stable condition.

## 2021-12-24 VITALS — RESPIRATION RATE: 16 BRPM

## 2021-12-24 LAB
BASOPHILS # BLD AUTO: 0 K/UL (ref 0–0.2)
BASOPHILS NFR BLD AUTO: 0 %
EOSINOPHIL # BLD AUTO: 0.1 K/UL (ref 0–0.7)
EOSINOPHIL NFR BLD AUTO: 1 %
ERYTHROCYTE [DISTWIDTH] IN BLOOD BY AUTOMATED COUNT: 3.44 M/UL (ref 3.8–5.4)
ERYTHROCYTE [DISTWIDTH] IN BLOOD: 21 % (ref 11.5–15.5)
HCT VFR BLD AUTO: 31.3 % (ref 34–46)
HGB BLD-MCNC: 9.7 GM/DL (ref 11.4–16)
LYMPHOCYTES # SPEC AUTO: 1.7 K/UL (ref 1–4.8)
LYMPHOCYTES NFR SPEC AUTO: 22 %
MCH RBC QN AUTO: 28.2 PG (ref 25–35)
MCHC RBC AUTO-ENTMCNC: 31 G/DL (ref 31–37)
MCV RBC AUTO: 91 FL (ref 80–100)
MONOCYTES # BLD AUTO: 0.5 K/UL (ref 0–1)
MONOCYTES NFR BLD AUTO: 7 %
NEUTROPHILS # BLD AUTO: 5.4 K/UL (ref 1.3–7.7)
NEUTROPHILS NFR BLD AUTO: 68 %
PLATELET # BLD AUTO: 207 K/UL (ref 150–450)
WBC # BLD AUTO: 7.9 K/UL (ref 4–11)

## 2021-12-24 RX ADMIN — THERA TABS SCH EACH: TAB at 07:53

## 2021-12-24 RX ADMIN — KETOROLAC TROMETHAMINE SCH: 30 INJECTION, SOLUTION INTRAMUSCULAR at 05:50

## 2021-12-24 RX ADMIN — ACETAMINOPHEN SCH MG: 500 TABLET ORAL at 02:42

## 2021-12-24 RX ADMIN — IBUPROFEN SCH MG: 600 TABLET ORAL at 15:22

## 2021-12-24 RX ADMIN — DOCUSATE SODIUM AND SENNOSIDES SCH EACH: 50; 8.6 TABLET ORAL at 07:52

## 2021-12-24 RX ADMIN — IBUPROFEN SCH MG: 600 TABLET ORAL at 05:46

## 2021-12-24 RX ADMIN — DIMETHICONE PRN MG: 80 TABLET, CHEWABLE ORAL at 13:22

## 2021-12-24 RX ADMIN — IBUPROFEN SCH MG: 600 TABLET ORAL at 00:04

## 2021-12-24 RX ADMIN — DOCUSATE SODIUM AND SENNOSIDES SCH: 50; 8.6 TABLET ORAL at 19:19

## 2021-12-24 RX ADMIN — KETOROLAC TROMETHAMINE SCH: 30 INJECTION, SOLUTION INTRAMUSCULAR at 00:04

## 2021-12-24 NOTE — P.DS
Providers


Date of admission: 


21 06:05





Expected date of discharge: 21


Attending physician: 


Amy Humphrey





Primary care physician: 


Stated None








- Discharge Diagnosis(es)


(1) 39 weeks gestation of pregnancy


Current Visit: No   Status: Acute   





(2) Anemia


Current Visit: No   Status: Acute   


Hospital Course: 





This is a 21-year-old female  1 para 0 at 40 weeks who presented for 

induction of labor.  She underwent oxytocin induction of labor and then required

a very low transverse  section on 2021 for delivery of a viable 

female infant with Apgar scores of 8 at 1 minute and 9 at 5 minutes and infant 

weight of 7 lbs. 4 oz.  This was for failure to progress.  Her postoperative 

course has been uncomplicated.  She is passing flatus but no bowel movement yet.

 Her pain is well-controlled with ibuprofen and Tylenol.  She was working on 

breast-feeding but has switched to bottle feeding.  Vital signs are stable.  

Abdomen is soft with positive bowel sounds 4.  Incision is clean dry and 

intact.  Staples are in place.  Extremities show trace edema.  Impression is 

status post primary low transverse  section postoperative day #1.  Plan 

is to continue postoperative and postpartum care today and discharge home 

tomorrow morning.  Staples will be removed and Steri-Strips placed prior to 

discharge.  She is given a prescription for a breast pump and ibuprofen.  She is

advised to continue taking her prenatal vitamins and iron at home.  She is 

advised to call the office if she has any further questions or concerns prior to

her appointment.  Routine postpartum and postoperative instructions are given.  

She is advised to follow up this next week in the office for a postoperative 

check.


Procedures: 





Primary low transverse  section on 2021


Patient Condition at Discharge: Stable





Plan - Discharge Summary


Discharge Rx Participant: Yes


New Discharge Prescriptions: 


New


   Acetaminophen Tab [Tylenol] 1,000 mg PO Q6H  tab


   Ibuprofen [Motrin] 600 mg PO Q6H #60 tab





Continue


   Multivitamins, Thera [Multivitamin (formulary)] 1 tab PO DAILY


Discharge Medication List





Multivitamins, Thera [Multivitamin (formulary)] 1 tab PO DAILY 18 

[History]


Acetaminophen Tab [Tylenol] 1,000 mg PO Q6H  tab 21 [Rx]


Ibuprofen [Motrin] 600 mg PO Q6H #60 tab 21 [Rx]








Follow up Appointment(s)/Referral(s): 


Amy Humphrey DO [Doctor of Osteopathic Medicine] - 22 2:00 pm (Post Op 

2022 at 01:30)


Activity/Diet/Wound Care/Special Instructions: 


Postpartum Instructions





1.  Do not begin any exercise program for 3 weeks.


2.  Do not resume sexual relations for 3 weeks or longer if uncomfortable.


3.  You may take tub baths or showers at any time.


4.  You may use tampons if desired after 3 weeks.


5.  Keep the area of episiotomy (stitches) clean and dry.


6.  If you are not nursing, wear a good fitting, supportive bra during the day 

and limit fluid intake for at least 1 week to prevent breast engorgement.


7.  Call the office, [phone], within the next week to make appointment for your

6 week checkup if it has not already been made.


8.  Report any of the following occurrences to the doctor promptly:


     a.  Heavy, excessive bleeding


     b.  Chills, fever


     c.  Burning or frequency of urination


     d.  Pain or redness and breasts if nursing


     e.  Increasing pain or swelling in episiotomy (stitches).











In addition to the above instructions, the following additional should be 

followed:


1.  No heavy lifting or straining (exercising) until after 6 week checkup.


2.  Keep abdominal incision clean and dry: You may wear a dressing if more 

comfortable.


3.  Make office appointment for 10 days after going home or as instructed by her

doctor.


Discharge Disposition: HOME SELF-CARE

## 2021-12-25 VITALS — DIASTOLIC BLOOD PRESSURE: 69 MMHG | SYSTOLIC BLOOD PRESSURE: 105 MMHG | HEART RATE: 73 BPM | TEMPERATURE: 97.9 F

## 2021-12-25 RX ADMIN — DIMETHICONE PRN MG: 80 TABLET, CHEWABLE ORAL at 06:54

## 2021-12-25 RX ADMIN — ACETAMINOPHEN SCH MG: 500 TABLET ORAL at 11:49

## 2021-12-25 RX ADMIN — THERA TABS SCH: TAB at 11:08

## 2021-12-25 RX ADMIN — IBUPROFEN SCH MG: 600 TABLET ORAL at 06:55

## 2021-12-25 RX ADMIN — IBUPROFEN SCH: 600 TABLET ORAL at 11:54

## 2021-12-25 RX ADMIN — DIMETHICONE PRN MG: 80 TABLET, CHEWABLE ORAL at 00:04

## 2021-12-25 RX ADMIN — KETOROLAC TROMETHAMINE SCH: 30 INJECTION, SOLUTION INTRAMUSCULAR at 07:32

## 2021-12-25 RX ADMIN — ACETAMINOPHEN SCH: 500 TABLET ORAL at 07:33

## 2021-12-25 RX ADMIN — DOCUSATE SODIUM AND SENNOSIDES SCH: 50; 8.6 TABLET ORAL at 11:08

## 2021-12-25 RX ADMIN — KETOROLAC TROMETHAMINE SCH: 30 INJECTION, SOLUTION INTRAMUSCULAR at 07:31

## 2021-12-25 RX ADMIN — ACETAMINOPHEN SCH: 500 TABLET ORAL at 07:32

## 2021-12-25 RX ADMIN — ACETAMINOPHEN SCH: 500 TABLET ORAL at 07:34

## 2021-12-25 RX ADMIN — ACETAMINOPHEN SCH MG: 500 TABLET ORAL at 04:06

## 2021-12-25 RX ADMIN — IBUPROFEN SCH MG: 600 TABLET ORAL at 00:04

## 2021-12-26 NOTE — P.PN
Progress Note - Text





21  153


21-year-old female status post  with spinal Duramorph.  Patient seen 

and evaluated for postop pain control, patient has a VAS of 3 with no complains 

of nausea vomiting or pruritus.  Doing well

## 2022-02-02 ENCOUNTER — HOSPITAL ENCOUNTER (OUTPATIENT)
Dept: HOSPITAL 47 - LABWHC1 | Age: 22
Discharge: HOME | End: 2022-02-02
Attending: OBSTETRICS & GYNECOLOGY
Payer: COMMERCIAL

## 2022-02-02 DIAGNOSIS — N91.2: Primary | ICD-10-CM

## 2022-02-02 PROCEDURE — 36415 COLL VENOUS BLD VENIPUNCTURE: CPT

## 2022-02-02 PROCEDURE — 84702 CHORIONIC GONADOTROPIN TEST: CPT
